# Patient Record
Sex: FEMALE | Race: WHITE | Employment: FULL TIME | URBAN - METROPOLITAN AREA
[De-identification: names, ages, dates, MRNs, and addresses within clinical notes are randomized per-mention and may not be internally consistent; named-entity substitution may affect disease eponyms.]

---

## 2018-05-31 ENCOUNTER — HOSPITAL ENCOUNTER (EMERGENCY)
Facility: CLINIC | Age: 33
Discharge: HOME | End: 2018-05-31
Attending: EMERGENCY MEDICINE | Admitting: EMERGENCY MEDICINE
Payer: COMMERCIAL

## 2018-05-31 VITALS
SYSTOLIC BLOOD PRESSURE: 132 MMHG | BODY MASS INDEX: 44.41 KG/M2 | WEIGHT: 292 LBS | OXYGEN SATURATION: 95 % | DIASTOLIC BLOOD PRESSURE: 75 MMHG | RESPIRATION RATE: 20 BRPM | TEMPERATURE: 97.9 F | HEART RATE: 75 BPM

## 2018-05-31 DIAGNOSIS — H66.90 ACUTE OTITIS MEDIA, UNSPECIFIED OTITIS MEDIA TYPE: ICD-10-CM

## 2018-05-31 DIAGNOSIS — H83.09 LABYRINTHITIS, UNSPECIFIED LATERALITY: ICD-10-CM

## 2018-05-31 LAB
ANION GAP SERPL CALCULATED.3IONS-SCNC: 7 MMOL/L (ref 3–14)
BASOPHILS # BLD AUTO: 0.1 10E9/L (ref 0–0.2)
BASOPHILS NFR BLD AUTO: 1 %
BUN SERPL-MCNC: 9 MG/DL (ref 7–30)
CALCIUM SERPL-MCNC: 7.8 MG/DL (ref 8.5–10.1)
CHLORIDE SERPL-SCNC: 109 MMOL/L (ref 94–109)
CO2 SERPL-SCNC: 27 MMOL/L (ref 20–32)
CREAT SERPL-MCNC: 0.69 MG/DL (ref 0.52–1.04)
DIFFERENTIAL METHOD BLD: NORMAL
EOSINOPHIL # BLD AUTO: 0.4 10E9/L (ref 0–0.7)
EOSINOPHIL NFR BLD AUTO: 6.2 %
ERYTHROCYTE [DISTWIDTH] IN BLOOD BY AUTOMATED COUNT: 12.9 % (ref 10–15)
GFR SERPL CREATININE-BSD FRML MDRD: >90 ML/MIN/1.7M2
GLUCOSE SERPL-MCNC: 102 MG/DL (ref 70–99)
HCT VFR BLD AUTO: 36.4 % (ref 35–47)
HGB BLD-MCNC: 12.2 G/DL (ref 11.7–15.7)
IMM GRANULOCYTES # BLD: 0 10E9/L (ref 0–0.4)
IMM GRANULOCYTES NFR BLD: 0.3 %
INTERPRETATION ECG - MUSE: NORMAL
LYMPHOCYTES # BLD AUTO: 1.7 10E9/L (ref 0.8–5.3)
LYMPHOCYTES NFR BLD AUTO: 27.9 %
MCH RBC QN AUTO: 30.7 PG (ref 26.5–33)
MCHC RBC AUTO-ENTMCNC: 33.5 G/DL (ref 31.5–36.5)
MCV RBC AUTO: 92 FL (ref 78–100)
MONOCYTES # BLD AUTO: 0.5 10E9/L (ref 0–1.3)
MONOCYTES NFR BLD AUTO: 8.3 %
NEUTROPHILS # BLD AUTO: 3.5 10E9/L (ref 1.6–8.3)
NEUTROPHILS NFR BLD AUTO: 56.3 %
NRBC # BLD AUTO: 0 10*3/UL
NRBC BLD AUTO-RTO: 0 /100
PLATELET # BLD AUTO: 332 10E9/L (ref 150–450)
POTASSIUM SERPL-SCNC: 3.6 MMOL/L (ref 3.4–5.3)
RBC # BLD AUTO: 3.98 10E12/L (ref 3.8–5.2)
SODIUM SERPL-SCNC: 143 MMOL/L (ref 133–144)
TROPONIN I SERPL-MCNC: <0.015 UG/L (ref 0–0.04)
WBC # BLD AUTO: 6.2 10E9/L (ref 4–11)

## 2018-05-31 PROCEDURE — 96375 TX/PRO/DX INJ NEW DRUG ADDON: CPT

## 2018-05-31 PROCEDURE — 85025 COMPLETE CBC W/AUTO DIFF WBC: CPT | Performed by: PHYSICIAN ASSISTANT

## 2018-05-31 PROCEDURE — 80048 BASIC METABOLIC PNL TOTAL CA: CPT | Performed by: PHYSICIAN ASSISTANT

## 2018-05-31 PROCEDURE — 96374 THER/PROPH/DIAG INJ IV PUSH: CPT

## 2018-05-31 PROCEDURE — 99284 EMERGENCY DEPT VISIT MOD MDM: CPT | Mod: 25

## 2018-05-31 PROCEDURE — 93005 ELECTROCARDIOGRAM TRACING: CPT

## 2018-05-31 PROCEDURE — 25000132 ZZH RX MED GY IP 250 OP 250 PS 637: Performed by: PHYSICIAN ASSISTANT

## 2018-05-31 PROCEDURE — 84484 ASSAY OF TROPONIN QUANT: CPT | Performed by: PHYSICIAN ASSISTANT

## 2018-05-31 PROCEDURE — 25000128 H RX IP 250 OP 636: Performed by: PHYSICIAN ASSISTANT

## 2018-05-31 PROCEDURE — 96361 HYDRATE IV INFUSION ADD-ON: CPT

## 2018-05-31 RX ORDER — KETOROLAC TROMETHAMINE 30 MG/ML
30 INJECTION, SOLUTION INTRAMUSCULAR; INTRAVENOUS ONCE
Status: COMPLETED | OUTPATIENT
Start: 2018-05-31 | End: 2018-05-31

## 2018-05-31 RX ORDER — AZITHROMYCIN 250 MG/1
TABLET, FILM COATED ORAL
Qty: 6 TABLET | Refills: 0 | Status: SHIPPED | OUTPATIENT
Start: 2018-05-31 | End: 2019-02-01

## 2018-05-31 RX ORDER — BENZONATATE 100 MG/1
100 CAPSULE ORAL ONCE
Status: COMPLETED | OUTPATIENT
Start: 2018-05-31 | End: 2018-05-31

## 2018-05-31 RX ORDER — SODIUM CHLORIDE 9 MG/ML
1000 INJECTION, SOLUTION INTRAVENOUS CONTINUOUS
Status: DISCONTINUED | OUTPATIENT
Start: 2018-05-31 | End: 2018-05-31 | Stop reason: HOSPADM

## 2018-05-31 RX ORDER — DIPHENHYDRAMINE HYDROCHLORIDE 50 MG/ML
25 INJECTION INTRAMUSCULAR; INTRAVENOUS ONCE
Status: COMPLETED | OUTPATIENT
Start: 2018-05-31 | End: 2018-05-31

## 2018-05-31 RX ORDER — DEXAMETHASONE SODIUM PHOSPHATE 10 MG/ML
10 INJECTION, SOLUTION INTRAMUSCULAR; INTRAVENOUS ONCE
Status: COMPLETED | OUTPATIENT
Start: 2018-05-31 | End: 2018-05-31

## 2018-05-31 RX ORDER — METOCLOPRAMIDE HYDROCHLORIDE 5 MG/ML
5 INJECTION INTRAMUSCULAR; INTRAVENOUS ONCE
Status: COMPLETED | OUTPATIENT
Start: 2018-05-31 | End: 2018-05-31

## 2018-05-31 RX ADMIN — DEXAMETHASONE SODIUM PHOSPHATE 10 MG: 10 INJECTION, SOLUTION INTRAMUSCULAR; INTRAVENOUS at 15:56

## 2018-05-31 RX ADMIN — BENZONATATE 100 MG: 100 CAPSULE ORAL at 15:37

## 2018-05-31 RX ADMIN — KETOROLAC TROMETHAMINE 30 MG: 30 INJECTION, SOLUTION INTRAMUSCULAR at 14:59

## 2018-05-31 RX ADMIN — SODIUM CHLORIDE 1000 ML: 9 INJECTION, SOLUTION INTRAVENOUS at 14:58

## 2018-05-31 RX ADMIN — METOCLOPRAMIDE 5 MG: 5 INJECTION, SOLUTION INTRAMUSCULAR; INTRAVENOUS at 14:58

## 2018-05-31 RX ADMIN — DIPHENHYDRAMINE HYDROCHLORIDE 25 MG: 50 INJECTION, SOLUTION INTRAMUSCULAR; INTRAVENOUS at 14:59

## 2018-05-31 ASSESSMENT — ENCOUNTER SYMPTOMS
VOICE CHANGE: 0
TROUBLE SWALLOWING: 0
DIARRHEA: 0
CHILLS: 0
ABDOMINAL PAIN: 0
SHORTNESS OF BREATH: 0
DIZZINESS: 0
NUMBNESS: 1
WEAKNESS: 0
VOMITING: 0
SORE THROAT: 0
NAUSEA: 0
FEVER: 0

## 2018-05-31 NOTE — ED TRIAGE NOTES
Patient states she is having jaw and ear numbness x 2 hours, Left leg and right hand feels numb. Cough since Sunday. ABC intact alert and no distress.

## 2018-05-31 NOTE — ED AVS SNAPSHOT
[facility] Emergency Department    [address]    [facility] [address]    Phone:  [phone]    Fax:  [phone]                                       Joann Mckeon   MRN: [id number]    Department:  [facility] Emergency Department   Date of Visit:  5/31/2018           After Visit Summary Signature Page     I have received my discharge instructions, and my questions have been answered. I have discussed any challenges I see with this plan with the nurse or doctor.    ..........................................................................................................................................  Patient/Patient Representative Signature      ..........................................................................................................................................  Patient Representative Print Name and Relationship to Patient    ..................................................               ................................................  Date                                            Time    ..........................................................................................................................................  Reviewed by Signature/Title    ...................................................              ..............................................  Date                                                            Time

## 2018-05-31 NOTE — ED PROVIDER NOTES
"  History     Chief Complaint:  Numbness     The history is provided by the patient.      Joann Mckeon is a 32 year old female who presents with jaw and ear numbness that began abruptly this morning while the patient was at work.  The patient states that he felt \"tingly\" on both sides of her jaw extending up into her ears.  She also had associated numbness in her left leg and right hand.  She reports that this episode lasted approximately 1 hour and symptoms have since resolved, with slight residual tingling sensation in her ears.  She reports no associated weakness, dizziness, chest pain, shortness of breath.  Of note the patient has had a dry cough for the past week.  She also had a GI illness last week.  She has otherwise been healthy and denies fever, sore throat, rhinorrhea, ear pain, nausea or vomiting.  Patient reports that she has never had an episode like this in the past.  She also endorses temporal headache that began while she was waiting in triage at the ED.    The patient reports that she has had frequent ear infections throughout her lifetime, the last being over 10 years ago. She rarely has pain with her infections. She also has had TM rupture with these infections.  She denies previous allergic reaction or anaphylaxis.  Reports that today her lips felt \"swollen\" at that she had no swallowing and her tongue felt normal.  Denies associated rash.    Allergies:  Amoxicillin  Augmentin  Bactrim  Ceclor    Medications:    Ibuprofen 20 mg tab  Ibuprofen 600 mg tablet  Multivitamin  Zofran 4 mg tabs  Percocet 5-3 25 mg tab    Past Medical History:    Dysmenorrhea  Menorrhalgia  Osteoarthritis  Ovarian cysts    Past Surgical History:    Hysterectomy  Laparoscopic cholecystectomy  LEEP  D&C  Tonsillectomy and adenoidectomy    Family History:    Father-cancer, thyroid disease, diabetes  Mother-cancer, diabetes, hypertension  Sister-cancer  Paternal grandmother-thyroid disease    Social History:  Report " reports smoking 1 pack of cigarettes per day  Denies smokeless tobacco use  Reports occasional alcohol use  Denies illicit drug use or marijuana use  Presents to the ED today with her mother  PCP: Kingsley Cole     Review of Systems   Constitutional: Negative for chills and fever.   HENT: Negative for ear discharge, hearing loss, sore throat, trouble swallowing and voice change.    Eyes: Negative for visual disturbance.   Respiratory: Negative for shortness of breath.    Cardiovascular: Negative for chest pain.   Gastrointestinal: Negative for abdominal pain, diarrhea, nausea and vomiting.   Skin: Negative for rash.   Neurological: Positive for numbness. Negative for dizziness, syncope and weakness.   All other systems reviewed and are negative.    Physical Exam     Patient Vitals for the past 24 hrs:   BP Temp Temp src Pulse Resp SpO2 Weight   05/31/18 1451 137/85 - - - - 96 % -   05/31/18 1332 137/79 97.9  F (36.6  C) Oral 88 20 96 % 132.5 kg (292 lb)        Physical Exam  General: Patient appears fatigued on exam and frequently closes her eyes with conversation.  Oriented ×3  Skin: Good turgor, no rash, no unusual bruising or prominent lesions.  HEENT: Head: Normocephalic, atraumatic, no visible or palpable masses, depressions, or scaring.  Eyes: Visual acuity intact, conjunctiva clear, sclera non-icteric, EOM intact, PERRL.   Ears: Right TM appears erythematous, left TM is not visualized due to cerumen. Hearing intact. N  ose: No external lesions, mucosa non-inflamed, septum and turbinates normal.   Throat/pharynx: Mucous membranes moist, no mucosal lesions.Mucosa non-inflamed, no tonsillar hypertrophy or exudate.   Neck: Supple, without lesions, bruits, or adenopathy, thyroid non-enlarged and non-tender.  Cardiac: No cardiomegaly or thrills; regular rate and rhythm, no murmur or gallop.   Lungs: Clear to auscultation and percussion   Abdomen: Bowel sounds normal, no tenderness, organomegaly, masses, or  hernia. No guarding or rebound tenderness.   Musculoskeletal: Normal gait and station.  Full strength in all 4 extremities.  Neurologic: CN 2-12 normal. Sensation to pain, touch, and proprioception normal.  Negative Romberg.  Normal finger to nose, and shin to heel.  Psychiatric: Intact recent and remote memory, judgment and insight, normal mood and affect.     Emergency Department Course   ECG  Time: 1348  Vent. Rate 81 bpm. VT interval 162. QRS duration 108. QT/QTc 408/473. P-R-T axis 45 7 32. Sinus rhythm. Minimal voltage criteria for LVH, may be normal variant. Borderline ECG. Read time: 1350 by my supervising physician Dr. Becerra     Imaging:  None     Laboratory:  CBC  WBC: 6.2, RBC: 3.98, Hgb: 12.2, PLT: 332, o/w WNL.    BMP  NA: 143, potassium: 3.6, chloride: 109, creatinine: 0.69, o/w WNL.     Troponin  <0.015    Procedures:  None    Interventions:  1458 1000mL NS   1458 5 mg reglan  1459 benadryl 25 mg IV   1459 toradol 30 mg IV   1532 tessalon 100 mg PO  1553 decadron 10 mg IV     Emergency Department Course:  Past medical records, nursing notes, and vitals reviewed.  I performed an exam of the patient and obtained history, as documented above.    1520 I reevaluated the patient and updated her as to the results of her workup thus far. Her symptoms have lessened but her cough remains. I ordered her tessalon.     I rechecked the patient. Findings and plan explained to the Patient and her mother. Patient was agreeable to discharge.     Impression & Plan    Medical Decision Making:  Joann Mckeon is a 33 yo female who presents with facial numbness associated headache.  Details of the history can be seen in HPI.  Upon evaluation, the patient had resolution of her symptoms with residual mild tingling of her neck and years.  Her neurological exam was within normal limits.  Her right TM was erythematous.  The patient also noted recent cough that was seen on exam, lung fields were clear.  Differential diagnosis  included acute otitis media, labyrinthitis, migraine, URI, [place], angioedema, CAD, stroke, etc. CBC, BMP, and troponin were all obtained and found to be within normal limits.  ECG was also normal.  She was offered IV fluids, Benadryl, Reglan, Toradol for her headache.  She also provided Decadron for likely labyrinthitis.  Patient continues to cough throughout her stay in the ED, Tessalon was provided.  Due to her exam findings noted above and lack of abnormal findings on laboratory interventions, I felt that her diagnosis was due to acute otitis media and labyrinthitis.  I do not feel that further investigation studies are needed at this time and felt comfortable with the patient's discharge.  Due to her history of allergy to numerous antibiotics, she was prescribed azithromycin for her AOM. She was told to follow-up with her primary and then the next few days for reevaluation of her symptoms. The patient should return to the ED if her numbness worsens, headache worsens, chest pain or shortness of breath develop, or other concerns arise. All questions were answered prior to the patient's discharge. She was in agreement with the plan stated above.    Diagnosis:    ICD-10-CM    1. Acute otitis media, unspecified otitis media type H66.90    2. Labyrinthitis, unspecified laterality H83.09       Discharge Medications:  New Prescriptions    AZITHROMYCIN (ZITHROMAX Z-MIGUEL) 250 MG TABLET    Two tablets on the first day, then one tablet daily for the next 4 days        5/31/2018   Tad Becerra, *     LIZZIE Lopez Moriah R, PA  05/31/18 1634       Natalie Pulido PA  05/31/18 1634

## 2018-05-31 NOTE — DISCHARGE INSTRUCTIONS
"Take the antibiotic as prescribed. Follow-up with your primary care provider in the next few days for reevaluation. Return to the ED if numbness or headache worsens, or if other concerns develop.       Discharge Instructions  Otitis Media  You or your child have an ear infection known as acute otitis media, or middle ear infection (otitis = ear, media = middle). These infections often develop after a virus infection, such as a cold. The cold causes swelling around the pressure-equalizing tube of the ear, which allows fluid to build up in the space behind the eardrum (the middle ear). This fluid build-up can trap bacteria and viruses and increase pressure on the eardrum causing pain.  Return to the Emergency Department if:    You or your child are not better within 24-48 hours.    Your child becomes very fussy or weak.    Your child is showing signs of dehydration, such as less than 3 wet diapers per day.    Your symptoms get worse, or if you develop a severe headache, stiff neck, or new symptoms.    You have signs of allergic reaction to medicine. These include rash, lip swelling, difficulty breathing, wheezing, and dizziness.    Ear infection symptoms:     Symptoms of an ear infection can include ear aching or pain and temporary hearing loss. These symptoms often come on suddenly.    Ear infection symptoms (infants / young children):    Fever (temperature greater than 100.4 F or 38 C).    Pulling on the ear.    Fussiness.    Decreased activity.    Lack of appetite or difficulty eating.    Vomiting or diarrhea.    Treatment:     The \"best\" treatment depends on your age, history of previous infections, and any underlying medical problems.    Antibiotics are not given to every patient with an ear infection because studies show that many people with ear infections will improve without using antibiotics. Because antibiotics can have side effects such as diarrhea and stomach upset and can also cause severe allergic " "reactions, doctors are trying to avoid using antibiotics if it is safe for the patient to do so.   In these cases, a prescription for antibiotics may be given to be filled in 24 -48 hours if symptoms are getting worse or not improving. If the symptoms are improving, the antibiotic does not need to be taken.     Remember, antibiotics do not treat pain.      Pain medications. You may take a pain medication such as Tylenol  (acetaminophen), Advil  (ibuprofen), Nuprin  (ibuprofen) or Aleve  (naproxen).  If you have been given a narcotic such as Vicodin  (hydrocodone with acetaminophen), Percocet  (oxycodone with acetaminophen), or codeine, do not drive for four hours after you have taken it. If the narcotic contains Tylenol  (acetaminophen), do not take Tylenol  with it. All narcotics will cause constipation, so eat a high fiber diet.      Pain treatment options also include ear drops such as Auralgan  (antipyrine/benzocaine/u-polycosanol), which contains a topical numbing medicine.     Do not take a medication if you have a known allergy to that medication.  Probiotics: If you have been given an antibiotic, you may want to also take a probiotic pill or eat yogurt with live cultures. Probiotics have \"good bacteria\" to help your intestines stay healthy. Studies have shown that probiotics help prevent diarrhea and other intestine problems (including C. diff infection) when you take antibiotics. You can buy these without a prescription in the pharmacy section of the store.   Complications:      Tympanic membrane rupture - One possible complication of an ear infection is rupture of the tympanic membrane, or ear drum. This happens because of pressure on the tympanic membrane from the infected fluid. When the tympanic membrane ruptures, you may have pus or blood drain from the ear. It does not hurt when the membrane ruptures, and many people actually feel better because pressure is released. Fortunately, the tympanic membrane " usually heals quickly after rupturing, within hours to days. You should keep water out of the ear until you re-check with your doctor to be sure the ear drum has healed.       Mastoiditis - Rarely, the area behind the ear can become infected, this area is called the mastoid.  If you notice redness and swelling behind your ear, see your physician or return to the Emergency Department immediately.        Hearing loss - The fluid that collects behind the eardrum (called an effusion) can persist for weeks to months after the pain of an ear infection resolves. An effusion causes trouble hearing, which is usually temporary. If the fluid persists, however, it can interfere with the process of learning to speak.   For this reason, children under 2 need to be seen by their pediatrician WITHIN 3 MONTHS to ensure that the fluid has been reabsorbed.  If you were given a prescription for medicine here today, be sure to read all of the information (including the package insert) that comes with your prescription.  This will include important information about the medicine, its side effects, and any warnings that you need to know about.  The pharmacist who fills the prescription can provide more information and answer questions you may have about the medicine.  If you have questions or concerns that the pharmacist cannot address, please call or return to the Emergency Department.   Opioid Medication Information    Pain medications are among the most commonly prescribed medicines, so we are including this information for all our patients. If you did not receive pain medication or get a prescription for pain medicine, you can ignore it.     You may have been given a prescription for an opioid (narcotic) pain medicine and/or have received a pain medicine while here in the Emergency Department. These medicines can make you drowsy or impaired. You must not drive, operate dangerous equipment, or engage in any other dangerous activities  while taking these medications. If you drive while taking these medications, you could be arrested for DUI, or driving under the influence. Do not drink any alcohol while you are taking these medications.     Opioid pain medications can cause addiction. If you have a history of chemical dependency of any type, you are at a higher risk of becoming addicted to pain medications.  Only take these prescribed medications to treat your pain when all other options have been tried. Take it for as short a time and as few doses as possible. Store your pain pills in a secure place, as they are frequently stolen and provide a dangerous opportunity for children or visitors in your house to start abusing these powerful medications. We will not replace any lost or stolen medicine.  As soon as your pain is better, you should flush all your remaining medication.     Many prescription pain medications contain Tylenol  (acetaminophen), including Vicodin , Tylenol #3 , Norco , Lortab , and Percocet .  You should not take any extra pills of Tylenol  if you are using these prescription medications or you can get very sick.  Do not ever take more than 3000 mg of acetaminophen in any 24 hour period.    All opioids tend to cause constipation. Drink plenty of water and eat foods that have a lot of fiber, such as fruits, vegetables, prune juice, apple juice and high fiber cereal.  Take a laxative if you don t move your bowels at least every other day. Miralax , Milk of Magnesia, Colace , or Senna  can be used to keep you regular.      Remember that you can always come back to the Emergency Department if you are not able to see your regular doctor in the amount of time listed above, if you get any new symptoms, or if there is anything that worries you.

## 2018-05-31 NOTE — ED AVS SNAPSHOT
[facility] Emergency Department    [address] [place] [address]    Phone:  [phone]    Fax:  [phone]                                       Joann Mckeon   MRN: [id number]    Department:  [facility] Emergency Department   Date of Visit:  5/31/2018           Patient Information     Date Of Birth          1985        Your diagnoses for this visit were:     Acute otitis media, unspecified otitis media type     Labyrinthitis, unspecified laterality        You were seen by Tad Becerra MD.      Follow-up Information     Go to [facility] Emergency Department.    Specialty:  EMERGENCY MEDICINE    Why:  If symptoms worsen    Contact information:    [address] [place] [address] [phone]        Follow up with Kingsley Cole MD In 5 days.    Specialty:  OB/Gyn    Why:  For recheck of symptoms     Contact information:    [facility]  [address] [place] [address]  [phone]          Discharge Instructions       Take the antibiotic as prescribed. Follow-up with your primary care provider in the next few days for reevaluation. Return to the ED if numbness or headache worsens, or if other concerns develop.       Discharge Instructions  Otitis Media  You or your child have an ear infection known as acute otitis media, or middle ear infection (otitis = ear, media = middle). These infections often develop after a virus infection, such as a cold. The cold causes swelling around the pressure-equalizing tube of the ear, which allows fluid to build up in the space behind the eardrum (the middle ear). This fluid build-up can trap bacteria and viruses and increase pressure on the eardrum causing pain.  Return to the Emergency Department if:    You or your child are not better within 24-48 hours.    Your child becomes very fussy or weak.    Your child is showing signs of dehydration, such as less  "than 3 wet diapers per day.    Your symptoms get worse, or if you develop a severe headache, stiff neck, or new symptoms.    You have signs of allergic reaction to medicine. These include rash, lip swelling, difficulty breathing, wheezing, and dizziness.    Ear infection symptoms:     Symptoms of an ear infection can include ear aching or pain and temporary hearing loss. These symptoms often come on suddenly.    Ear infection symptoms (infants / young children):    Fever (temperature greater than 100.4 F or 38 C).    Pulling on the ear.    Fussiness.    Decreased activity.    Lack of appetite or difficulty eating.    Vomiting or diarrhea.    Treatment:     The \"best\" treatment depends on your age, history of previous infections, and any underlying medical problems.    Antibiotics are not given to every patient with an ear infection because studies show that many people with ear infections will improve without using antibiotics. Because antibiotics can have side effects such as diarrhea and stomach upset and can also cause severe allergic reactions, doctors are trying to avoid using antibiotics if it is safe for the patient to do so.   In these cases, a prescription for antibiotics may be given to be filled in 24 -48 hours if symptoms are getting worse or not improving. If the symptoms are improving, the antibiotic does not need to be taken.     Remember, antibiotics do not treat pain.      Pain medications. You may take a pain medication such as Tylenol  (acetaminophen), Advil  (ibuprofen), Nuprin  (ibuprofen) or Aleve  (naproxen).  If you have been given a narcotic such as Vicodin  (hydrocodone with acetaminophen), Percocet  (oxycodone with acetaminophen), or codeine, do not drive for four hours after you have taken it. If the narcotic contains Tylenol  (acetaminophen), do not take Tylenol  with it. All narcotics will cause constipation, so eat a high fiber diet.      Pain treatment options also include ear drops " "such as Auralgan  (antipyrine/benzocaine/u-polycosanol), which contains a topical numbing medicine.     Do not take a medication if you have a known allergy to that medication.  Probiotics: If you have been given an antibiotic, you may want to also take a probiotic pill or eat yogurt with live cultures. Probiotics have \"good bacteria\" to help your intestines stay healthy. Studies have shown that probiotics help prevent diarrhea and other intestine problems (including C. diff infection) when you take antibiotics. You can buy these without a prescription in the pharmacy section of the store.   Complications:      Tympanic membrane rupture - One possible complication of an ear infection is rupture of the tympanic membrane, or ear drum. This happens because of pressure on the tympanic membrane from the infected fluid. When the tympanic membrane ruptures, you may have pus or blood drain from the ear. It does not hurt when the membrane ruptures, and many people actually feel better because pressure is released. Fortunately, the tympanic membrane usually heals quickly after rupturing, within hours to days. You should keep water out of the ear until you re-check with your doctor to be sure the ear drum has healed.       Mastoiditis - Rarely, the area behind the ear can become infected, this area is called the mastoid.  If you notice redness and swelling behind your ear, see your physician or return to the Emergency Department immediately.        Hearing loss - The fluid that collects behind the eardrum (called an effusion) can persist for weeks to months after the pain of an ear infection resolves. An effusion causes trouble hearing, which is usually temporary. If the fluid persists, however, it can interfere with the process of learning to speak.   For this reason, children under 2 need to be seen by their pediatrician WITHIN 3 MONTHS to ensure that the fluid has been reabsorbed.  If you were given a prescription for " medicine here today, be sure to read all of the information (including the package insert) that comes with your prescription.  This will include important information about the medicine, its side effects, and any warnings that you need to know about.  The pharmacist who fills the prescription can provide more information and answer questions you may have about the medicine.  If you have questions or concerns that the pharmacist cannot address, please call or return to the Emergency Department.   Opioid Medication Information    Pain medications are among the most commonly prescribed medicines, so we are including this information for all our patients. If you did not receive pain medication or get a prescription for pain medicine, you can ignore it.     You may have been given a prescription for an opioid (narcotic) pain medicine and/or have received a pain medicine while here in the Emergency Department. These medicines can make you drowsy or impaired. You must not drive, operate dangerous equipment, or engage in any other dangerous activities while taking these medications. If you drive while taking these medications, you could be arrested for DUI, or driving under the influence. Do not drink any alcohol while you are taking these medications.     Opioid pain medications can cause addiction. If you have a history of chemical dependency of any type, you are at a higher risk of becoming addicted to pain medications.  Only take these prescribed medications to treat your pain when all other options have been tried. Take it for as short a time and as few doses as possible. Store your pain pills in a secure place, as they are frequently stolen and provide a dangerous opportunity for children or visitors in your house to start abusing these powerful medications. We will not replace any lost or stolen medicine.  As soon as your pain is better, you should flush all your remaining medication.     Many prescription pain  medications contain Tylenol  (acetaminophen), including Vicodin , Tylenol #3 , Norco , Lortab , and Percocet .  You should not take any extra pills of Tylenol  if you are using these prescription medications or you can get very sick.  Do not ever take more than 3000 mg of acetaminophen in any 24 hour period.    All opioids tend to cause constipation. Drink plenty of water and eat foods that have a lot of fiber, such as fruits, vegetables, prune juice, apple juice and high fiber cereal.  Take a laxative if you don t move your bowels at least every other day. Miralax , Milk of Magnesia, Colace , or Senna  can be used to keep you regular.      Remember that you can always come back to the Emergency Department if you are not able to see your regular doctor in the amount of time listed above, if you get any new symptoms, or if there is anything that worries you.        Discharge References/Attachments     LABYRINTHITIS (ENGLISH)      24 Hour Appointment Hotline       To make an appointment at any [facility] clinic, call [phone] ([phone]). If you don't have a family doctor or clinic, we will help you find one. [facility] clinics are conveniently located to serve the needs of you and your family.             Review of your medicines      START taking        Dose / Directions Last dose taken    azithromycin 250 MG tablet   Commonly known as:  ZITHROMAX Z-MIGUEL   Quantity:  6 tablet        Two tablets on the first day, then one tablet daily for the next 4 days   Refills:  0          Our records show that you are taking the medicines listed below. If these are incorrect, please call your family doctor or clinic.        Dose / Directions Last dose taken    * ibuprofen 200 MG tablet   Commonly known as:  ADVIL/MOTRIN   Dose:  600 mg   Quantity:  30 tablet        Take 3 tablets (600 mg) by mouth every 8 hours as needed for mild pain   Refills:  0        * ibuprofen 600 MG tablet   Commonly known as:  ADVIL/MOTRIN   Dose:   600 mg   Quantity:  30 tablet        Take 1 tablet (600 mg) by mouth every 6 hours as needed for pain (mild)   Refills:  0        multivitamin, therapeutic with minerals Tabs tablet   Dose:  1 tablet        Take 1 tablet by mouth daily.   Refills:  0        ondansetron 4 MG tablet   Commonly known as:  ZOFRAN   Dose:  4-8 mg   Quantity:  20 tablet        Take 1-2 tablets (4-8 mg) by mouth every 8 hours as needed for nausea   Refills:  0        oxyCODONE-acetaminophen 5-325 MG per tablet   Commonly known as:  PERCOCET   Dose:  1-2 tablet   Quantity:  30 tablet        Take 1-2 tablets by mouth every 4 hours as needed for pain (moderate to severe)   Refills:  0        * Notice:  This list has 2 medication(s) that are the same as other medications prescribed for you. Read the directions carefully, and ask your doctor or other care provider to review them with you.            Prescriptions were sent or printed at these locations (1 Prescription)                   Other Prescriptions                Printed at Department/Unit printer (1 of 1)         azithromycin (ZITHROMAX Z-MIGUEL) 250 MG tablet                Procedures and tests performed during your visit     Basic metabolic panel    CBC with platelets differential    EKG 12-lead, tracing only    Peripheral IV catheter    Troponin I      Orders Needing Specimen Collection     None      Pending Results     No orders found from 5/29/2018 to 6/1/2018.            Pending Culture Results     No orders found from 5/29/2018 to 6/1/2018.            Pending Results Instructions     If you had any lab results that were not finalized at the time of your Discharge, you can call the ED Lab Result RN at [phone]. You will be contacted by this team for any positive Lab results or changes in treatment. The nurses are available 7 days a week from 10A to 6:30P.  You can leave a message 24 hours per day and they will return your call.        Test Results From Your Hospital Stay         5/31/2018  3:22 PM      Component Results     Component Value Ref Range & Units Status    Sodium 143 133 - 144 mmol/L Final    Potassium 3.6 3.4 - 5.3 mmol/L Final    Chloride 109 94 - 109 mmol/L Final    Carbon Dioxide 27 20 - 32 mmol/L Final    Anion Gap 7 3 - 14 mmol/L Final    Glucose 102 (H) 70 - 99 mg/dL Final    Urea Nitrogen 9 7 - 30 mg/dL Final    Creatinine 0.69 0.52 - 1.04 mg/dL Final    GFR Estimate >90 >60 mL/min/1.7m2 Final    Non  GFR Calc    GFR Estimate If Black >90 >60 mL/min/1.7m2 Final    African American GFR Calc    Calcium 7.8 (L) 8.5 - 10.1 mg/dL Final         5/31/2018  3:05 PM      Component Results     Component Value Ref Range & Units Status    WBC 6.2 4.0 - 11.0 10e9/L Final    RBC Count 3.98 3.8 - 5.2 10e12/L Final    Hemoglobin 12.2 11.7 - 15.7 g/dL Final    Hematocrit 36.4 35.0 - 47.0 % Final    MCV 92 78 - 100 fl Final    MCH 30.7 26.5 - 33.0 pg Final    MCHC 33.5 31.5 - 36.5 g/dL Final    RDW 12.9 10.0 - 15.0 % Final    Platelet Count 332 150 - 450 10e9/L Final    Diff Method Automated Method  Final    % Neutrophils 56.3 % Final    % Lymphocytes 27.9 % Final    % Monocytes 8.3 % Final    % Eosinophils 6.2 % Final    % Basophils 1.0 % Final    % Immature Granulocytes 0.3 % Final    Nucleated RBCs 0 0 /100 Final    Absolute Neutrophil 3.5 1.6 - 8.3 10e9/L Final    Absolute Lymphocytes 1.7 0.8 - 5.3 10e9/L Final    Absolute Monocytes 0.5 0.0 - 1.3 10e9/L Final    Absolute Eosinophils 0.4 0.0 - 0.7 10e9/L Final    Absolute Basophils 0.1 0.0 - 0.2 10e9/L Final    Abs Immature Granulocytes 0.0 0 - 0.4 10e9/L Final    Absolute Nucleated RBC 0.0  Final         5/31/2018  3:22 PM      Component Results     Component Value Ref Range & Units Status    Troponin I ES <0.015 0.000 - 0.045 ug/L Final    The 99th percentile for upper reference range is 0.045 ug/L.  Troponin values   in the range of 0.045 - 0.120 ug/L may be associated with risks of adverse   clinical events.                   Clinical Quality Measure: Blood Pressure Screening     Your blood pressure was checked while you were in the emergency department today. The last reading we obtained was  BP: 141/78 . Please read the guidelines below about what these numbers mean and what you should do about them.  If your systolic blood pressure (the top number) is less than 120 and your diastolic blood pressure (the bottom number) is less than 80, then your blood pressure is normal. There is nothing more that you need to do about it.  If your systolic blood pressure (the top number) is 120-139 or your diastolic blood pressure (the bottom number) is 80-89, your blood pressure may be higher than it should be. You should have your blood pressure rechecked within a year by a primary care provider.  If your systolic blood pressure (the top number) is 140 or greater or your diastolic blood pressure (the bottom number) is 90 or greater, you may have high blood pressure. High blood pressure is treatable, but if left untreated over time it can put you at risk for heart attack, stroke, or kidney failure. You should have your blood pressure rechecked by a primary care provider within the next 4 weeks.  If your provider in the emergency department today gave you specific instructions to follow-up with your doctor or provider even sooner than that, you should follow that instruction and not wait for up to 4 weeks for your follow-up visit.        Thank you for choosing [facility]       Thank you for choosing [facility] for your care. Our goal is always to provide you with excellent care. Hearing back from our patients is one way we can continue to improve our services. Please take a few minutes to complete the written survey that you may receive in the mail after you visit with us. Thank you!        [facility] Information     [employer] lets you send messages to your doctor, view your test results, renew your prescriptions, schedule appointments and more. To  "sign up, go to [email or web address] . Click on \"Log in\" on the left side of the screen, which will take you to the Welcome page. Then click on \"Sign up Now\" on the right side of the page.     You will be asked to enter the access code listed below, as well as some personal information. Please follow the directions to create your username and password.     Your access code is: [email or web address]  Expires: 2018  4:20 PM     Your access code will  in 90 days. If you need help or a new code, please call your [facility] clinic or [phone].        Care EveryWhere ID     This is your Care EveryWhere ID. This could be used by other organizations to access your [facility] medical records  [id number]        Equal Access to Services     GUNJAN ARZATE : Laurie Flynn, antoinette draper, madiha langley, josefa rubio. So [facility] [phone].    ATENCIÓN: Si habla español, tiene a steven disposición servicios gratuitos de asistencia lingüística. Llame al [phone].    We comply with applicable federal civil rights laws and [place] laws. We do not discriminate on the basis of race, color, national origin, age, disability, sex, sexual orientation, or gender identity.            After Visit Summary       This is your record. Keep this with you and show to your community pharmacist(s) and doctor(s) at your next visit.                  "

## 2019-02-01 ENCOUNTER — APPOINTMENT (OUTPATIENT)
Dept: GENERAL RADIOLOGY | Facility: CLINIC | Age: 34
End: 2019-02-01
Payer: COMMERCIAL

## 2019-02-01 ENCOUNTER — HOSPITAL ENCOUNTER (EMERGENCY)
Facility: CLINIC | Age: 34
Discharge: HOME | End: 2019-02-01
Attending: EMERGENCY MEDICINE | Admitting: EMERGENCY MEDICINE
Payer: COMMERCIAL

## 2019-02-01 VITALS
RESPIRATION RATE: 22 BRPM | HEIGHT: 68 IN | WEIGHT: 290 LBS | SYSTOLIC BLOOD PRESSURE: 122 MMHG | BODY MASS INDEX: 43.95 KG/M2 | HEART RATE: 62 BPM | TEMPERATURE: 98.3 F | DIASTOLIC BLOOD PRESSURE: 57 MMHG | OXYGEN SATURATION: 100 %

## 2019-02-01 DIAGNOSIS — M54.6 ACUTE LEFT-SIDED THORACIC BACK PAIN: ICD-10-CM

## 2019-02-01 DIAGNOSIS — R07.9 ACUTE CHEST PAIN: ICD-10-CM

## 2019-02-01 LAB
ANION GAP SERPL CALCULATED.3IONS-SCNC: 7 MMOL/L (ref 3–14)
BASOPHILS # BLD AUTO: 0.1 10E9/L (ref 0–0.2)
BASOPHILS NFR BLD AUTO: 0.6 %
BUN SERPL-MCNC: 14 MG/DL (ref 7–30)
CALCIUM SERPL-MCNC: 8.7 MG/DL (ref 8.5–10.1)
CHLORIDE SERPL-SCNC: 108 MMOL/L (ref 94–109)
CO2 SERPL-SCNC: 26 MMOL/L (ref 20–32)
CREAT SERPL-MCNC: 0.72 MG/DL (ref 0.52–1.04)
D DIMER PPP FEU-MCNC: 0.4 UG/ML FEU (ref 0–0.5)
DIFFERENTIAL METHOD BLD: NORMAL
EOSINOPHIL # BLD AUTO: 0.2 10E9/L (ref 0–0.7)
EOSINOPHIL NFR BLD AUTO: 2.2 %
ERYTHROCYTE [DISTWIDTH] IN BLOOD BY AUTOMATED COUNT: 13 % (ref 10–15)
GFR SERPL CREATININE-BSD FRML MDRD: >90 ML/MIN/{1.73_M2}
GLUCOSE SERPL-MCNC: 91 MG/DL (ref 70–99)
HCT VFR BLD AUTO: 42.5 % (ref 35–47)
HGB BLD-MCNC: 14.2 G/DL (ref 11.7–15.7)
IMM GRANULOCYTES # BLD: 0 10E9/L (ref 0–0.4)
IMM GRANULOCYTES NFR BLD: 0.1 %
INTERPRETATION ECG - MUSE: NORMAL
LYMPHOCYTES # BLD AUTO: 1.9 10E9/L (ref 0.8–5.3)
LYMPHOCYTES NFR BLD AUTO: 23.6 %
MCH RBC QN AUTO: 30.2 PG (ref 26.5–33)
MCHC RBC AUTO-ENTMCNC: 33.4 G/DL (ref 31.5–36.5)
MCV RBC AUTO: 90 FL (ref 78–100)
MONOCYTES # BLD AUTO: 0.6 10E9/L (ref 0–1.3)
MONOCYTES NFR BLD AUTO: 7 %
NEUTROPHILS # BLD AUTO: 5.4 10E9/L (ref 1.6–8.3)
NEUTROPHILS NFR BLD AUTO: 66.5 %
NRBC # BLD AUTO: 0 10*3/UL
NRBC BLD AUTO-RTO: 0 /100
PLATELET # BLD AUTO: 433 10E9/L (ref 150–450)
POTASSIUM SERPL-SCNC: 3.9 MMOL/L (ref 3.4–5.3)
RBC # BLD AUTO: 4.7 10E12/L (ref 3.8–5.2)
SODIUM SERPL-SCNC: 141 MMOL/L (ref 133–144)
TROPONIN I SERPL-MCNC: <0.015 UG/L (ref 0–0.04)
WBC # BLD AUTO: 8.1 10E9/L (ref 4–11)

## 2019-02-01 PROCEDURE — 99285 EMERGENCY DEPT VISIT HI MDM: CPT | Mod: 25

## 2019-02-01 PROCEDURE — 85379 FIBRIN DEGRADATION QUANT: CPT | Performed by: EMERGENCY MEDICINE

## 2019-02-01 PROCEDURE — 71046 X-RAY EXAM CHEST 2 VIEWS: CPT

## 2019-02-01 PROCEDURE — 85025 COMPLETE CBC W/AUTO DIFF WBC: CPT | Performed by: EMERGENCY MEDICINE

## 2019-02-01 PROCEDURE — 84484 ASSAY OF TROPONIN QUANT: CPT | Performed by: EMERGENCY MEDICINE

## 2019-02-01 PROCEDURE — 93005 ELECTROCARDIOGRAM TRACING: CPT

## 2019-02-01 PROCEDURE — 25000128 H RX IP 250 OP 636: Performed by: EMERGENCY MEDICINE

## 2019-02-01 PROCEDURE — 99285 EMERGENCY DEPT VISIT HI MDM: CPT

## 2019-02-01 PROCEDURE — 80048 BASIC METABOLIC PNL TOTAL CA: CPT | Performed by: EMERGENCY MEDICINE

## 2019-02-01 RX ORDER — IBUPROFEN 600 MG/1
600 TABLET, FILM COATED ORAL EVERY 6 HOURS
Qty: 30 TABLET | Refills: 0 | Status: SHIPPED | OUTPATIENT
Start: 2019-02-01 | End: 2019-02-04

## 2019-02-01 RX ORDER — KETOROLAC TROMETHAMINE 15 MG/ML
15 INJECTION, SOLUTION INTRAMUSCULAR; INTRAVENOUS ONCE
Status: COMPLETED | OUTPATIENT
Start: 2019-02-01 | End: 2019-02-01

## 2019-02-01 RX ADMIN — SODIUM CHLORIDE 1000 ML: 9 INJECTION, SOLUTION INTRAVENOUS at 12:09

## 2019-02-01 RX ADMIN — KETOROLAC TROMETHAMINE 15 MG: 15 INJECTION, SOLUTION INTRAMUSCULAR; INTRAVENOUS at 12:09

## 2019-02-01 ASSESSMENT — ENCOUNTER SYMPTOMS
CHEST TIGHTNESS: 1
ABDOMINAL PAIN: 0
WEAKNESS: 0
BACK PAIN: 1
FEVER: 0
NUMBNESS: 0

## 2019-02-01 ASSESSMENT — MIFFLIN-ST. JEOR: SCORE: 2058.93

## 2019-02-01 NOTE — ED TRIAGE NOTES
Pt has sudden onset of chest pain with radiation to the back, shortness of breath.  Some weakness to the left arm.  Pt was sitting filling out paper work when pain started.

## 2019-02-01 NOTE — ED PROVIDER NOTES
"  History     Chief Complaint:  Chest Pain and Back Pain    HPI   Joann Mckeon is a 33 year old female who presents for evaluation of left-sided back pain and chest tightness that began one hour prior to arrival. The patient reports sitting down when pain began and it felt like \"a tight muscle\", which is made worse with movement. She has not taken anything for her pain. She denies recent trauma, fall, or injury. The patient was sick last week, but reports feeling better this week. She has never had this pain before and denies history of blood clots. The patient denies possibility of pregnancy. No numbness or weakness. No abdominal pain or leg swelling. No fevers.      Allergies:  Amoxicillin  Augmentin  Bactrim  Ceclor [Cefaclor]      Medications:    The patient is not currently taking any prescribed medications.      Past Medical History:    Osteoarthritis   Ovarian cysts     Past Surgical History:    DaVinci hysterectomy, bilateral salpingo-oophorectomy   D&C  Cholecystectomy   GYN laparoscopy   Cervical leep   Tonsillectomy and adenoidectomy     Family History:    Diabetes   Hypertension   Uterine cancer   Thyroid disease   Lung cancer     Social History:  Patient presents with mother.  Smoking Status: former, quit 2010  Alcohol Use: yes   Drug Use: no     Marital Status:  Single [1]     Review of Systems   Constitutional: Negative for fever.   Respiratory: Positive for chest tightness.    Cardiovascular: Negative for leg swelling.   Gastrointestinal: Negative for abdominal pain.   Musculoskeletal: Positive for back pain.   Neurological: Negative for weakness and numbness.   All other systems reviewed and are negative.      Physical Exam     Patient Vitals for the past 24 hrs:   BP Temp Temp src Pulse Heart Rate Resp SpO2 Height Weight   02/01/19 1300 133/80 -- -- 70 67 -- 97 % -- --   02/01/19 1245 -- -- -- -- 68 -- 96 % -- --   02/01/19 1230 146/71 -- -- 68 68 -- 98 % -- --   02/01/19 1215 -- -- -- -- 68 -- " "98 % -- --   02/01/19 1200 141/85 -- -- 74 -- -- 97 % -- --   02/01/19 1151 (!) 146/92 98.3  F (36.8  C) Oral 76 -- 22 99 % 1.727 m (5' 8\") 131.5 kg (290 lb)      Physical Exam  General: Patient is alert and interactive when I enter the room  Head:  The scalp, face, and head appear normal  Eyes:  The pupils are equal, round, and reactive to light    Conjunctivae and sclerae are normal  ENT:    External acoustic canals are normal    The oropharynx is normal without erythema.     Uvula is in the midline  Neck:  Normal range of motion  CV:  Regular rate. S1/S2. No murmurs.   Resp:  Lungs are clear without wheezes or rales. No distress  GI:  Abdomen is soft, no rigidity, guarding, or rebound    No distension. No tenderness to palpation in any quadrant.    No flank pain to palpation.      Skin:  No rash or lesions noted. Normal capillary refill noted  Neuro: Speech is normal and fluent. Face is symmetric.     Moving all extremities well. See MS section below as well.  Psych:  Awake. Alert.  Normal affect.  Appropriate interactions.  Lymph: No anterior cervical lymphadenopathy noted  MS:  Normal tone. Joints grossly normal without effusions.     No asymmetric leg swelling, calf or thigh tenderness.    Detailed Back Exam:     Reflexes at patella and achilles are normal.  Sensation is intact in the legs and pelvis including the lower sacral nerve roots.  They are tender in the left thoracic perispinal muscles with reproduction of pain.  There is no significant paraspinal muscle spasm. There is no redness or edema about the back.  No midline spinal pain and no stepoffs.  SI joint is not tender.  No pain over piriformis muscle. Detailed strength exam is performed in lower extremities, there is symmetrical strength in all myotomes tested both proximally and distally.       Emergency Department Course     ECG:  ECG taken at 1150, ECG read at 1152  Normal sinus rhythm  Moderate voltage criteria for LVH, may be normal variant "   Borderline ECG   Rate 72 bpm. OH interval 158. QRS duration 106. QT/QTc 408/446. P-R-T axes 42,-1,21.     Imaging:  Radiology findings were communicated with the patient who voiced understanding of the findings.    XR Chest 2 Views  Preliminary Result  PA and lateral views of the chest. Lungs are clear. Heart  is normal in size. No effusions are evident. No pneumothorax.  Reading per radiology     Laboratory:  Laboratory findings were communicated with the patient who voiced understanding of the findings.    CBC: WBC 8.1, HGB 14.2,   BMP: WNL (Creatinine 0.72)  D Dimer (Collected 1202): 0.4    Interventions:  1209 Toradol 15 mg IV  1209 NS, 1 L, IV      Emergency Department Course:  Nursing notes and vitals reviewed.  I entered the room.  I performed an exam of the patient as documented above.     EKG obtained in the ED, see results above.      IV was inserted and blood was drawn for laboratory testing, results above.    The patient was sent for X-ray while in the emergency department, results above.     The patient received the above intervention(s).     1310 the patient was rechecked and updated regarding the results of the laboratory and imaging studies.      I discussed the treatment plan with the patient. They expressed understanding of this plan and consented to discharge. They will be discharged home with instructions for care and follow up. In addition, the patient will return to the emergency department if their symptoms worsen, if new symptoms arise or if there is any concern.  All questions were answered.     Impression & Plan      Medical Decision Making:  Joann Mckeon is a 33 year old female who presents with chest pain, thoracic back pain which seems most likely to be musculoskeletal.  The work up in the Emergency Department is negative.  I considered a broad differential diagnosis in this patient including life-threatening etiologies such as acute coronary syndrome, myocardial infarction,  pulmonary embolism, acute aortic dissection, myocarditis, pericarditis, acute valvular insufficiency amongst others.  Other causes considered for this patient included pneumonia, pneumothorax, chest wall source, pericarditis, pleurisy, esophageal spasm, etc.  No serious etiology for the chest pain were detected today during this visit.  Close follow up with primary care is indicated should the pain continue, as further work up may be performed; this was made clear to the patient, who understands.         Diagnosis:    ICD-10-CM    1. Acute chest pain R07.9    2. Acute left-sided thoracic back pain M54.6      Disposition:   The patient was discharged to home.    Discharge Medications:  Discharge Medication List as of 2/1/2019  1:46 PM      START taking these medications    Details   ibuprofen (ADVIL/MOTRIN) 600 MG tablet Take 1 tablet (600 mg) by mouth every 6 hours for 3 days Take every 6 hours with food for three days., Disp-30 tablet, R-0, Local Print           Scribe Disclosure:  Harpreet RIVAS, am serving as a scribe at 11:50 AM on 2/1/2019 to document services personally performed by Kieran Yang MD, based on my observations and the provider's statements to me.  [facility] EMERGENCY DEPARTMENT       Kieran Yang MD  02/01/19 4809

## 2019-02-01 NOTE — ED AVS SNAPSHOT
[facility] Emergency Department  [address]  [facility] [address]  Phone:  [phone]  Fax:  [phone]                                    Joann Mckeon   MRN: [id number]    Department:  [facility] Emergency Department   Date of Visit:  2/1/2019           After Visit Summary Signature Page    I have received my discharge instructions, and my questions have been answered. I have discussed any challenges I see with this plan with the nurse or doctor.    ..........................................................................................................................................  Patient/Patient Representative Signature      ..........................................................................................................................................  Patient Representative Print Name and Relationship to Patient    ..................................................               ................................................  Date                                   Time    ..........................................................................................................................................  Reviewed by Signature/Title    ...................................................              ..............................................  Date                                               Time          22EPIC Rev 08/18

## 2019-07-09 ENCOUNTER — HOSPITAL ENCOUNTER (EMERGENCY)
Facility: CLINIC | Age: 34
Discharge: HOME | End: 2019-07-09
Attending: EMERGENCY MEDICINE | Admitting: EMERGENCY MEDICINE
Payer: COMMERCIAL

## 2019-07-09 VITALS
HEART RATE: 94 BPM | TEMPERATURE: 99.1 F | SYSTOLIC BLOOD PRESSURE: 132 MMHG | RESPIRATION RATE: 18 BRPM | HEIGHT: 68 IN | OXYGEN SATURATION: 98 % | BODY MASS INDEX: 42.57 KG/M2 | DIASTOLIC BLOOD PRESSURE: 71 MMHG | WEIGHT: 280.87 LBS

## 2019-07-09 DIAGNOSIS — J02.9 ACUTE PHARYNGITIS, UNSPECIFIED ETIOLOGY: ICD-10-CM

## 2019-07-09 DIAGNOSIS — R50.9 FEVER, UNSPECIFIED FEVER CAUSE: ICD-10-CM

## 2019-07-09 LAB
ANION GAP SERPL CALCULATED.3IONS-SCNC: 4 MMOL/L (ref 3–14)
BASOPHILS # BLD AUTO: 0 10E9/L (ref 0–0.2)
BASOPHILS NFR BLD AUTO: 0.3 %
BUN SERPL-MCNC: 12 MG/DL (ref 7–30)
CALCIUM SERPL-MCNC: 8.8 MG/DL (ref 8.5–10.1)
CHLORIDE SERPL-SCNC: 109 MMOL/L (ref 94–109)
CO2 SERPL-SCNC: 28 MMOL/L (ref 20–32)
CREAT SERPL-MCNC: 0.79 MG/DL (ref 0.52–1.04)
DEPRECATED S PYO AG THROAT QL EIA: NORMAL
DIFFERENTIAL METHOD BLD: ABNORMAL
EOSINOPHIL # BLD AUTO: 0.1 10E9/L (ref 0–0.7)
EOSINOPHIL NFR BLD AUTO: 0.7 %
ERYTHROCYTE [DISTWIDTH] IN BLOOD BY AUTOMATED COUNT: 13.4 % (ref 10–15)
GFR SERPL CREATININE-BSD FRML MDRD: >90 ML/MIN/{1.73_M2}
GLUCOSE SERPL-MCNC: 89 MG/DL (ref 70–99)
HCT VFR BLD AUTO: 41.7 % (ref 35–47)
HETEROPH AB SER QL: NEGATIVE
HGB BLD-MCNC: 13.7 G/DL (ref 11.7–15.7)
IMM GRANULOCYTES # BLD: 0 10E9/L (ref 0–0.4)
IMM GRANULOCYTES NFR BLD: 0.3 %
LYMPHOCYTES # BLD AUTO: 1.2 10E9/L (ref 0.8–5.3)
LYMPHOCYTES NFR BLD AUTO: 10.6 %
MCH RBC QN AUTO: 30.2 PG (ref 26.5–33)
MCHC RBC AUTO-ENTMCNC: 32.9 G/DL (ref 31.5–36.5)
MCV RBC AUTO: 92 FL (ref 78–100)
MONOCYTES # BLD AUTO: 0.7 10E9/L (ref 0–1.3)
MONOCYTES NFR BLD AUTO: 6.1 %
NEUTROPHILS # BLD AUTO: 9.4 10E9/L (ref 1.6–8.3)
NEUTROPHILS NFR BLD AUTO: 82 %
NRBC # BLD AUTO: 0 10*3/UL
NRBC BLD AUTO-RTO: 0 /100
PLATELET # BLD AUTO: 302 10E9/L (ref 150–450)
POTASSIUM SERPL-SCNC: 4 MMOL/L (ref 3.4–5.3)
RBC # BLD AUTO: 4.53 10E12/L (ref 3.8–5.2)
SODIUM SERPL-SCNC: 141 MMOL/L (ref 133–144)
SPECIMEN SOURCE: NORMAL
WBC # BLD AUTO: 11.5 10E9/L (ref 4–11)

## 2019-07-09 PROCEDURE — 96360 HYDRATION IV INFUSION INIT: CPT

## 2019-07-09 PROCEDURE — 99283 EMERGENCY DEPT VISIT LOW MDM: CPT | Mod: 25

## 2019-07-09 PROCEDURE — 25000132 ZZH RX MED GY IP 250 OP 250 PS 637: Performed by: EMERGENCY MEDICINE

## 2019-07-09 PROCEDURE — 80048 BASIC METABOLIC PNL TOTAL CA: CPT | Performed by: EMERGENCY MEDICINE

## 2019-07-09 PROCEDURE — 86308 HETEROPHILE ANTIBODY SCREEN: CPT | Performed by: EMERGENCY MEDICINE

## 2019-07-09 PROCEDURE — 96361 HYDRATE IV INFUSION ADD-ON: CPT

## 2019-07-09 PROCEDURE — 85025 COMPLETE CBC W/AUTO DIFF WBC: CPT | Performed by: EMERGENCY MEDICINE

## 2019-07-09 PROCEDURE — 87081 CULTURE SCREEN ONLY: CPT | Performed by: EMERGENCY MEDICINE

## 2019-07-09 PROCEDURE — 87880 STREP A ASSAY W/OPTIC: CPT | Performed by: EMERGENCY MEDICINE

## 2019-07-09 PROCEDURE — 25000128 H RX IP 250 OP 636: Performed by: EMERGENCY MEDICINE

## 2019-07-09 RX ORDER — SODIUM CHLORIDE 9 MG/ML
1000 INJECTION, SOLUTION INTRAVENOUS CONTINUOUS
Status: DISCONTINUED | OUTPATIENT
Start: 2019-07-09 | End: 2019-07-09 | Stop reason: HOSPADM

## 2019-07-09 RX ORDER — ACETAMINOPHEN 500 MG
1000 TABLET ORAL EVERY 4 HOURS PRN
Status: DISCONTINUED | OUTPATIENT
Start: 2019-07-09 | End: 2019-07-09 | Stop reason: HOSPADM

## 2019-07-09 RX ADMIN — SODIUM CHLORIDE 1000 ML: 9 INJECTION, SOLUTION INTRAVENOUS at 12:10

## 2019-07-09 RX ADMIN — ACETAMINOPHEN 1000 MG: 500 TABLET, FILM COATED ORAL at 12:09

## 2019-07-09 ASSESSMENT — ENCOUNTER SYMPTOMS
RHINORRHEA: 0
COUGH: 0
FEVER: 1
DYSURIA: 0
VOMITING: 0
SORE THROAT: 1

## 2019-07-09 ASSESSMENT — MIFFLIN-ST. JEOR: SCORE: 2012.5

## 2019-07-09 NOTE — ED AVS SNAPSHOT
[facility] Emergency Department  [address]  [facility] [address]  Phone:  [phone]  Fax:  [phone]                                    Joann Mckeon   MRN: [id number]    Department:  [facility] Emergency Department   Date of Visit:  7/9/2019           After Visit Summary Signature Page    I have received my discharge instructions, and my questions have been answered. I have discussed any challenges I see with this plan with the nurse or doctor.    ..........................................................................................................................................  Patient/Patient Representative Signature      ..........................................................................................................................................  Patient Representative Print Name and Relationship to Patient    ..................................................               ................................................  Date                                   Time    ..........................................................................................................................................  Reviewed by Signature/Title    ...................................................              ..............................................  Date                                               Time          22EPIC Rev 08/18

## 2019-07-09 NOTE — ED PROVIDER NOTES
"  History     Chief Complaint:  Pharyngitis     HPI   Joann Mckeon is a 34 year old female who presents with her mother to the emergency department with pharyngitis. The patient reports that beginning yesterday she noticed throat pain. This worsened at about 0300, which was now 8 hours ago. She also noted a fever to 100.3. Persistent sore throat prompts her evaluation here. She denies attempting to manage this pain with any analgesics. She denies cough, vomiting, dysuria, rhinorrhea, known sick contacts.     Allergies:  Amoxicillin  Augmentin  bactrim  Ceclor     Medications:    The patient is currently on no regular medications.    Past Medical History:    OA  Dysmenorrhea  Menorrhagia  Ovarian cysts    Past Surgical History:    Hysterectomy  D&C  Cholecystectomy  ENT  Cervical leep    Family History:    DM  Thyroid disease  Cancer  HTN    Social History:  Former smoker: quit 2010  Positive for alcohol use.      Review of Systems   Constitutional: Positive for fever.   HENT: Positive for sore throat. Negative for rhinorrhea.    Respiratory: Negative for cough.    Gastrointestinal: Negative for vomiting.   Genitourinary: Negative for dysuria.   All other systems reviewed and are negative.    Physical Exam     Patient Vitals for the past 24 hrs:   BP Temp Temp src Pulse Heart Rate Resp SpO2 Height Weight   07/09/19 1230 -- 99.1  F (37.3  C) Oral -- -- -- -- -- --   07/09/19 1032 132/71 -- -- -- -- -- -- -- --   07/09/19 1031 -- 99.2  F (37.3  C) Oral 94 94 18 98 % 1.727 m (5' 8\") 127.4 kg (280 lb 13.9 oz)     Physical Exam    General: Patient is alert and interactive when I enter the room  Head:  The scalp, face, and head appear normal  Eyes:  Conjunctivae are normal  ENT:    The nose is normal    Pinnae are normal    External acoustic canals are normal, posterior pharynx clear, no PTA, no exudates   Neck:  Trachea midline, mild anterior cervical tenderness, good ROM, no rigidity   CV:  Pulses are normal, RRR "   Resp:  No respiratory distress, CTAB   Abdomen:      Soft, non-tender, non-distended  Musc:  Normal muscular tone    No major joint effusions    No asymmetric leg swelling  Skin:  No rash or lesions noted  Neuro:  Speech is normal and fluent. Face is symmetric.     Moving all extremities well.   Psych: Awake. Alert.  Normal affect.  Appropriate interactions.      Emergency Department Course     Laboratory:  CBC: WBC: 11.5, HGB: 13.7, PLT: 302  BMP: All WNL (Creatinine: 0.79)    Rapid strep: negative  Strep culture: PENDING    Mono screen: negative    Interventions:    1209 Tylenol 1 g Oral  1210 NS 1L IV      Emergency Department Course:  Nursing notes and vitals reviewed. (1040) I performed an exam of the patient as documented above.       (8443) I rechecked the patient and discussed the results of her workup thus far.     Findings and plan explained to the Patient. Patient discharged home with instructions regarding supportive care, medications, and reasons to return. The importance of close follow-up was reviewed.     I personally reviewed the laboratory results with the Patient and answered all related questions prior to discharge.       Impression & Plan      Medical Decision Making:    Joann Mckeon is a 34 year old female who presents for evaluation of a sore throat and clinical evidence of pharyngitis.  The rapid strep test is negative, and formal culture has been set up in the lab. There is no clinical evidence of peritonsillar abscess, retropharyngeal abscess, Lemierre's Syndrome, epiglottis, or Gilberto's angina. The etiology is most likely viral.  Mono screen also negative.  I have recommended treatment with analgesics, and we will await formal culture results.  If the culture is positive, an ED physician will call the patient to initiate anti-microbial therapy.  Patient's main concern was her body aches.  She remained afebrile here.  We gave her Tylenol ibuprofen she otherwise appeared well and so this  is likely a viral syndrome especially with her sore throat, body aches and otherwise well appearance.  Patient became upset as we could not find a diagnosis for her.  I told her that she can continue to take Tylenol ibuprofen and follow-up with her primary.  If she has any worsening symptoms she can always return.         Diagnosis:    ICD-10-CM    1. Acute pharyngitis, unspecified etiology J02.9    2. Fever, unspecified fever cause R50.9        Disposition:  discharged to home    Scribe Disclosure:  I, Sammy Crespo, am serving as a scribe on 7/9/2019 at 10:40 AM to personally document services performed by aFye Medrano MD based on my observations and the provider's statements to me.     Sammy Crespo  7/9/2019   [facility] EMERGENCY DEPARTMENT       Faye Medrano MD  07/10/19 0396

## 2019-07-09 NOTE — ED TRIAGE NOTES
Pt reports body aches x 1 week, sore throat and fever starting yesterday. Last Advil was at 3 am today.

## 2019-07-12 LAB
BACTERIA SPEC CULT: NORMAL
Lab: NORMAL
SPECIMEN SOURCE: NORMAL